# Patient Record
Sex: FEMALE | Race: WHITE | NOT HISPANIC OR LATINO | URBAN - METROPOLITAN AREA
[De-identification: names, ages, dates, MRNs, and addresses within clinical notes are randomized per-mention and may not be internally consistent; named-entity substitution may affect disease eponyms.]

---

## 2020-09-18 ENCOUNTER — OFFICE VISIT (OUTPATIENT)
Dept: URBAN - METROPOLITAN AREA CLINIC 109 | Facility: CLINIC | Age: 60
End: 2020-09-18

## 2020-10-05 ENCOUNTER — OFFICE VISIT (OUTPATIENT)
Dept: URBAN - METROPOLITAN AREA TELEHEALTH 2 | Facility: TELEHEALTH | Age: 60
End: 2020-10-05

## 2020-10-26 ENCOUNTER — OFFICE VISIT (OUTPATIENT)
Dept: URBAN - METROPOLITAN AREA TELEHEALTH 2 | Facility: TELEHEALTH | Age: 60
End: 2020-10-26
Payer: COMMERCIAL

## 2020-10-26 DIAGNOSIS — E11.9 TYPE 2 DIABETES MELLITUS WITHOUT COMPLICATION, WITHOUT LONG-TERM CURRENT USE OF INSULIN: ICD-10-CM

## 2020-10-26 DIAGNOSIS — I25.10 CORONARY ARTERY DISEASE INVOLVING NATIVE CORONARY ARTERY OF NATIVE HEART WITHOUT ANGINA PECTORIS: ICD-10-CM

## 2020-10-26 DIAGNOSIS — K21.00 GASTROESOPHAGEAL REFLUX DISEASE WITH ESOPHAGITIS WITHOUT HEMORRHAGE: ICD-10-CM

## 2020-10-26 PROBLEM — 443502000: Status: ACTIVE | Noted: 2020-10-26

## 2020-10-26 PROBLEM — 313436004: Status: ACTIVE | Noted: 2020-10-26

## 2020-10-26 PROCEDURE — 99213 OFFICE O/P EST LOW 20 MIN: CPT | Performed by: INTERNAL MEDICINE

## 2020-10-26 RX ORDER — LANSOPRAZOLE 30 MG/1
TAKE 1 CAPSULE (30 MG) BY ORAL ROUTE ONCE DAILY BEFORE A MEAL FOR 90 DAYS CAPSULE, DELAYED RELEASE ORAL 1
Qty: 90 | Refills: 4 | COMMUNITY
Start: 2020-04-16 | End: 2021-07-10

## 2020-10-26 RX ORDER — LANSOPRAZOLE 30 MG/1
TAKE 1 CAPSULE (30 MG) BY ORAL ROUTE ONCE DAILY BEFORE A MEAL FOR 90 DAYS CAPSULE, DELAYED RELEASE ORAL 1
Qty: 90 CAPSULE | Refills: 3
Start: 2020-04-16

## 2020-10-26 RX ORDER — CLOPIDOGREL 75 MG/1
TABLET ORAL
Qty: 0 | Refills: 0 | COMMUNITY
Start: 2017-01-01

## 2020-10-26 RX ORDER — IBUPROFEN 600 MG/1
TABLET ORAL
Qty: 0 | Refills: 0 | COMMUNITY
Start: 2016-11-11

## 2020-10-26 NOTE — HPI-TODAY'S VISIT:
The patient returns for this telephone/telehealth visit for history of gastroesophageal reflux.  Her endoscopy in 2019 revealed a small hiatus hernia and a slightly irregular Z-line but no evidence of Talavera's.  She may have had Talavera's on her previous endoscopy with biopsies.  She is status post gastric bypass status. Currently she reports feeling well except for an increase in her reflux symptoms perhaps related to recent social stressors.  She has been on lansoprazole 30 mg daily.  She denies dysphagia.  She has had some difficulty with some plant-based foods although is trying to hold her weight down.  There is been no abdominal pain but she did have a spell of nausea and vomiting.

## 2021-01-05 ENCOUNTER — CLAIMS CREATED FROM THE CLAIM WINDOW (OUTPATIENT)
Dept: URBAN - METROPOLITAN AREA TELEHEALTH 2 | Facility: TELEHEALTH | Age: 61
End: 2021-01-05
Payer: COMMERCIAL

## 2021-01-05 DIAGNOSIS — E66.01 MORBID OBESITY WITH BMI OF 40.0-44.9, ADULT: ICD-10-CM

## 2021-01-05 DIAGNOSIS — K21.00 GASTROESOPHAGEAL REFLUX DISEASE WITH ESOPHAGITIS WITHOUT HEMORRHAGE: ICD-10-CM

## 2021-01-05 DIAGNOSIS — I73.9 PERIPHERAL VASCULAR DISEASE: ICD-10-CM

## 2021-01-05 DIAGNOSIS — Z12.11 COLON CANCER SCREENING: ICD-10-CM

## 2021-01-05 PROBLEM — 38341003 HYPERTENSION: Status: ACTIVE | Noted: 2021-01-05

## 2021-01-05 PROBLEM — 195967001 ASTHMA: Status: ACTIVE | Noted: 2021-01-05

## 2021-01-05 PROBLEM — 128302006 CHRONIC HEPATITIS C: Status: ACTIVE | Noted: 2021-01-05

## 2021-01-05 PROBLEM — 235856003 LIVER DISEASE: Status: ACTIVE | Noted: 2021-01-05

## 2021-01-05 PROBLEM — 268006001 MUSCLE, LIGAMENT AND FASCIA DISORDERS: Status: ACTIVE | Noted: 2021-01-05

## 2021-01-05 PROBLEM — 73211009 DM - DIABETES MELLITUS: Status: ACTIVE | Noted: 2021-01-05

## 2021-01-05 PROBLEM — 3723001 ARTHRITIS: Status: ACTIVE | Noted: 2021-01-05

## 2021-01-05 PROBLEM — 235595009 GASTROESOPHAGEAL REFLUX DISEASE: Status: ACTIVE | Noted: 2021-01-05

## 2021-01-05 PROBLEM — 414916001 OBESITY: Status: ACTIVE | Noted: 2021-01-05

## 2021-01-05 PROBLEM — 271737000 ANEMIA: Status: ACTIVE | Noted: 2021-01-05

## 2021-01-05 PROBLEM — 302914006 BARRETTS ESOPHAGUS: Status: ACTIVE | Noted: 2021-01-05

## 2021-01-05 PROCEDURE — 3017F COLORECTAL CA SCREEN DOC REV: CPT | Performed by: INTERNAL MEDICINE

## 2021-01-05 PROCEDURE — G9903 PT SCRN TBCO ID AS NON USER: HCPCS | Performed by: INTERNAL MEDICINE

## 2021-01-05 PROCEDURE — G8427 DOCREV CUR MEDS BY ELIG CLIN: HCPCS | Performed by: INTERNAL MEDICINE

## 2021-01-05 PROCEDURE — 1036F TOBACCO NON-USER: CPT | Performed by: INTERNAL MEDICINE

## 2021-01-05 PROCEDURE — G8417 CALC BMI ABV UP PARAM F/U: HCPCS | Performed by: INTERNAL MEDICINE

## 2021-01-05 PROCEDURE — 99213 OFFICE O/P EST LOW 20 MIN: CPT | Performed by: INTERNAL MEDICINE

## 2021-01-05 PROCEDURE — G8482 FLU IMMUNIZE ORDER/ADMIN: HCPCS | Performed by: INTERNAL MEDICINE

## 2021-01-05 RX ORDER — LANSOPRAZOLE 30 MG/1
TAKE 1 CAPSULE (30 MG) BY ORAL ROUTE ONCE DAILY BEFORE A MEAL FOR 90 DAYS CAPSULE, DELAYED RELEASE ORAL 1
Qty: 90 CAPSULE | Refills: 3 | Status: ACTIVE | COMMUNITY
Start: 2020-04-16

## 2021-01-05 RX ORDER — CLOPIDOGREL 75 MG/1
TABLET ORAL
Qty: 0 | Refills: 0 | Status: ACTIVE | COMMUNITY
Start: 2017-01-01

## 2021-01-05 RX ORDER — IBUPROFEN 600 MG/1
TABLET ORAL
Qty: 0 | Refills: 0 | Status: ACTIVE | COMMUNITY
Start: 2016-11-11

## 2021-01-05 NOTE — HPI-OTHER HISTORIES
The patient returns for this telehealth visit for a history of GERD and to discuss colon cancer screening.   She had and EGD in 2019 revealing gastric bypass anatomy and mild esophagitis but no Talavera's.  Talavera's had been found on previous EGD with biopsies.  GERD is currently well controlled on lansoprazole 30mg daily.  Colonoscopy was last done in 2009 by our records and was normal.  She is due for routine screening study.  The patient has PVD and stents in the lower extremities for which she takes clopidogrel under the care of Dr. Davie Garcia. She has no history of cardiac disease and has no SOB or chest pain.  Patient has DM, obesity, Htn.

## 2021-02-08 ENCOUNTER — LAB OUTSIDE AN ENCOUNTER (OUTPATIENT)
Dept: URBAN - METROPOLITAN AREA CLINIC 92 | Facility: CLINIC | Age: 61
End: 2021-02-08

## 2021-02-08 ENCOUNTER — TELEPHONE ENCOUNTER (OUTPATIENT)
Dept: URBAN - METROPOLITAN AREA CLINIC 92 | Facility: CLINIC | Age: 61
End: 2021-02-08

## 2021-02-10 ENCOUNTER — TELEPHONE ENCOUNTER (OUTPATIENT)
Dept: URBAN - METROPOLITAN AREA CLINIC 86 | Facility: CLINIC | Age: 61
End: 2021-02-10

## 2021-02-10 RX ORDER — LANSOPRAZOLE 30 MG/1
TAKE 1 CAPSULE (30 MG) BY ORAL ROUTE ONCE DAILY BEFORE A MEAL FOR 90 DAYS CAPSULE, DELAYED RELEASE ORAL ONCE A DAY
Qty: 90 CAPSULE | Refills: 3
Start: 2020-04-16

## 2021-02-10 RX ORDER — PROMETHAZINE HYDROCHLORIDE 25 MG/1
1 TABLET AS NEEDED TABLET ORAL
Qty: 90 TABLET | Refills: 3 | OUTPATIENT
Start: 2021-02-10 | End: 2022-02-05

## 2021-06-18 ENCOUNTER — TELEPHONE ENCOUNTER (OUTPATIENT)
Dept: URBAN - METROPOLITAN AREA CLINIC 92 | Facility: CLINIC | Age: 61
End: 2021-06-18

## 2021-12-28 ENCOUNTER — ERX REFILL RESPONSE (OUTPATIENT)
Dept: URBAN - METROPOLITAN AREA CLINIC 109 | Facility: CLINIC | Age: 61
End: 2021-12-28

## 2021-12-28 RX ORDER — PROMETHAZINE HYDROCHLORIDE 25 MG/1
1 TABLET AS NEEDED TABLET ORAL
Qty: 90 TABLET | Refills: 3 | OUTPATIENT

## 2021-12-28 RX ORDER — PROMETHAZINE HYDROCHLORIDE 25 MG/1
TAKE 1 TABLET EVERY 6 HOURS AS NEEDED TABLET ORAL
Qty: 90 TABLET | Refills: 4 | OUTPATIENT

## 2022-04-05 ENCOUNTER — OFFICE VISIT (OUTPATIENT)
Dept: URBAN - METROPOLITAN AREA CLINIC 109 | Facility: CLINIC | Age: 62
End: 2022-04-05
Payer: COMMERCIAL

## 2022-04-05 VITALS — HEIGHT: 63 IN | TEMPERATURE: 97.2 F | WEIGHT: 239 LBS | BODY MASS INDEX: 42.35 KG/M2

## 2022-04-05 DIAGNOSIS — Z86.19 HISTORY OF HEPATITIS C: ICD-10-CM

## 2022-04-05 DIAGNOSIS — I73.9 PVD (PERIPHERAL VASCULAR DISEASE): ICD-10-CM

## 2022-04-05 DIAGNOSIS — K21.9 GASTROESOPHAGEAL REFLUX DISEASE, UNSPECIFIED WHETHER ESOPHAGITIS PRESENT: ICD-10-CM

## 2022-04-05 PROBLEM — 235595009: Status: ACTIVE | Noted: 2022-04-05

## 2022-04-05 PROCEDURE — 99213 OFFICE O/P EST LOW 20 MIN: CPT | Performed by: INTERNAL MEDICINE

## 2022-04-05 RX ORDER — PROMETHAZINE HYDROCHLORIDE 25 MG/1
1 TABLET AS NEEDED TABLET ORAL
Qty: 90 TABLET | Refills: 3

## 2022-04-05 RX ORDER — PROMETHAZINE HYDROCHLORIDE 25 MG/1
TAKE 1 TABLET EVERY 6 HOURS AS NEEDED TABLET ORAL
Qty: 90 TABLET | Refills: 4 | Status: ACTIVE | COMMUNITY

## 2022-04-05 RX ORDER — LANSOPRAZOLE 30 MG/1
TAKE 1 CAPSULE (30 MG) BY ORAL ROUTE ONCE DAILY BEFORE A MEAL FOR 90 DAYS CAPSULE, DELAYED RELEASE ORAL ONCE A DAY
Qty: 90 CAPSULE | Refills: 3 | Status: ACTIVE | COMMUNITY
Start: 2020-04-16

## 2022-04-05 RX ORDER — LANSOPRAZOLE 30 MG/1
TAKE 1 CAPSULE (30 MG) BY ORAL ROUTE ONCE DAILY BEFORE A MEAL FOR 90 DAYS CAPSULE, DELAYED RELEASE ORAL ONCE A DAY
Qty: 90 CAPSULES | Refills: 3
Start: 2020-04-16

## 2022-04-05 RX ORDER — IBUPROFEN 600 MG/1
TABLET ORAL
Qty: 0 | Refills: 0 | Status: ACTIVE | COMMUNITY
Start: 2016-11-11

## 2022-04-05 RX ORDER — CLOPIDOGREL 75 MG/1
TABLET ORAL
Qty: 0 | Refills: 0 | Status: ACTIVE | COMMUNITY
Start: 2017-01-01

## 2022-04-05 NOTE — HPI-TODAY'S VISIT:
The patient returns for care of GERD. She has been on lanzoprazole 30mg daily with reasonable control of sx. No dysphagia.  Some nausea at times. She had EGD in the past suggesting Barretts but this was not documented at the last set of biopsies on EGD in 2019.  PMH notable for PVD under the care of Dr. Davie Garcia.  On clopidogrel.

## 2022-12-09 ENCOUNTER — WEB ENCOUNTER (OUTPATIENT)
Dept: URBAN - METROPOLITAN AREA CLINIC 109 | Facility: CLINIC | Age: 62
End: 2022-12-09

## 2022-12-09 RX ORDER — PROMETHAZINE HYDROCHLORIDE 25 MG/1
1 TABLET AS NEEDED TABLET ORAL
Qty: 90 TABLETS | Refills: 3

## 2022-12-09 RX ORDER — LANSOPRAZOLE 30 MG/1
1 CAPSULE BEFORE A MEAL CAPSULE, DELAYED RELEASE ORAL ONCE A DAY
Qty: 90 CAPSULE | Refills: 3
Start: 2020-04-16

## 2022-12-19 ENCOUNTER — OFFICE VISIT (OUTPATIENT)
Dept: URBAN - METROPOLITAN AREA CLINIC 109 | Facility: CLINIC | Age: 62
End: 2022-12-19
Payer: COMMERCIAL

## 2022-12-19 VITALS
WEIGHT: 240 LBS | HEIGHT: 63 IN | TEMPERATURE: 97.3 F | BODY MASS INDEX: 42.52 KG/M2 | DIASTOLIC BLOOD PRESSURE: 65 MMHG | HEART RATE: 76 BPM | SYSTOLIC BLOOD PRESSURE: 106 MMHG

## 2022-12-19 DIAGNOSIS — I73.9 PERIPHERAL VASCULAR DISEASE: ICD-10-CM

## 2022-12-19 DIAGNOSIS — K21.00 GASTROESOPHAGEAL REFLUX DISEASE WITH ESOPHAGITIS WITHOUT HEMORRHAGE: ICD-10-CM

## 2022-12-19 DIAGNOSIS — I89.0 LYMPHEDEMA: ICD-10-CM

## 2022-12-19 DIAGNOSIS — E66.01 MORBID OBESITY: ICD-10-CM

## 2022-12-19 PROBLEM — 234097001: Status: ACTIVE | Noted: 2022-12-19

## 2022-12-19 PROBLEM — 238136002: Status: ACTIVE | Noted: 2022-12-19

## 2022-12-19 PROBLEM — 400047006 PERIPHERAL VASCULAR DISEASE: Status: ACTIVE | Noted: 2021-01-05

## 2022-12-19 PROCEDURE — 99213 OFFICE O/P EST LOW 20 MIN: CPT | Performed by: INTERNAL MEDICINE

## 2022-12-19 RX ORDER — LANSOPRAZOLE 30 MG/1
1 CAPSULE TABLET, ORALLY DISINTEGRATING, DELAYED RELEASE ORAL ONCE A DAY
Qty: 90 CAPSULE | Refills: 3
Start: 2020-04-16

## 2022-12-19 RX ORDER — LANSOPRAZOLE 30 MG/1
TAKE 1 CAPSULE (30 MG) BY ORAL ROUTE ONCE DAILY BEFORE A MEAL FOR 90 DAYS CAPSULE, DELAYED RELEASE ORAL ONCE A DAY
Qty: 90 CAPSULES | Refills: 3 | Status: ACTIVE | COMMUNITY
Start: 2020-04-16

## 2022-12-19 RX ORDER — IBUPROFEN 600 MG/1
TABLET ORAL
Qty: 0 | Refills: 0 | Status: ACTIVE | COMMUNITY
Start: 2016-11-11

## 2022-12-19 RX ORDER — PROMETHAZINE HYDROCHLORIDE 25 MG/1
1 TABLET AS NEEDED TABLET ORAL
Qty: 90 TABLET | Refills: 3 | Status: ACTIVE | COMMUNITY

## 2022-12-19 RX ORDER — CLOPIDOGREL 75 MG/1
TABLET ORAL
Qty: 0 | Refills: 0 | Status: ACTIVE | COMMUNITY
Start: 2017-01-01

## 2022-12-19 NOTE — HPI-TODAY'S VISIT:
The patient returns for care of GERD. She is currently taking Lansoprazole 30mg daily. Heartburn is mostly under control on this. Has excess gas from beans. She has anemia  at times. Recently normal.  Her last colonoscopy was about 2020 and was unremarkable by her report.   PMH gastric bypass in 2018, Hx hep C treated in 2018 successfully.           DM, PVD on clopidogrel, chronic lymphedema.

## 2022-12-21 ENCOUNTER — TELEPHONE ENCOUNTER (OUTPATIENT)
Dept: URBAN - METROPOLITAN AREA CLINIC 109 | Facility: CLINIC | Age: 62
End: 2022-12-21

## 2022-12-21 RX ORDER — LANSOPRAZOLE 30 MG/1
1 CAPSULE TABLET, ORALLY DISINTEGRATING, DELAYED RELEASE ORAL ONCE A DAY
Qty: 90 CAPSULE | Refills: 3
Start: 2020-04-16

## 2023-01-11 ENCOUNTER — TELEPHONE ENCOUNTER (OUTPATIENT)
Dept: URBAN - METROPOLITAN AREA CLINIC 109 | Facility: CLINIC | Age: 63
End: 2023-01-11

## 2023-01-11 RX ORDER — LANSOPRAZOLE 30 MG/1
1 CAPSULE TABLET, ORALLY DISINTEGRATING, DELAYED RELEASE ORAL ONCE A DAY
Qty: 90 CAPSULE | Refills: 3 | Status: ACTIVE | COMMUNITY
Start: 2020-04-16

## 2023-01-11 RX ORDER — CLOPIDOGREL 75 MG/1
TABLET ORAL
Qty: 0 | Refills: 0 | Status: ACTIVE | COMMUNITY
Start: 2017-01-01

## 2023-01-11 RX ORDER — PANTOPRAZOLE SODIUM 40 MG/1
1 TABLET TABLET, DELAYED RELEASE ORAL ONCE A DAY
Qty: 90 TABLET | Refills: 3 | OUTPATIENT
Start: 2023-01-11

## 2023-01-11 RX ORDER — PROMETHAZINE HYDROCHLORIDE 25 MG/1
1 TABLET AS NEEDED TABLET ORAL
Qty: 90 TABLET | Refills: 3 | Status: ACTIVE | COMMUNITY

## 2023-01-11 RX ORDER — IBUPROFEN 600 MG/1
TABLET ORAL
Qty: 0 | Refills: 0 | Status: ACTIVE | COMMUNITY
Start: 2016-11-11

## 2023-01-11 RX ORDER — LANSOPRAZOLE 30 MG/1
1 CAPSULE TABLET, ORALLY DISINTEGRATING, DELAYED RELEASE ORAL ONCE A DAY
OUTPATIENT
Start: 2020-04-16

## 2023-06-13 ENCOUNTER — WEB ENCOUNTER (OUTPATIENT)
Dept: URBAN - METROPOLITAN AREA CLINIC 109 | Facility: CLINIC | Age: 63
End: 2023-06-13

## 2023-06-13 RX ORDER — PANTOPRAZOLE SODIUM 40 MG/1
1 TABLET TABLET, DELAYED RELEASE ORAL ONCE A DAY
Qty: 90 TABLET | Refills: 3
Start: 2023-01-11

## 2023-06-13 RX ORDER — PROMETHAZINE HYDROCHLORIDE 25 MG/1
1 TABLET AS NEEDED TABLET ORAL
Qty: 90 TABLET | Refills: 3

## 2023-06-19 ENCOUNTER — OFFICE VISIT (OUTPATIENT)
Dept: URBAN - METROPOLITAN AREA CLINIC 109 | Facility: CLINIC | Age: 63
End: 2023-06-19
Payer: COMMERCIAL

## 2023-06-19 VITALS
BODY MASS INDEX: 41.43 KG/M2 | TEMPERATURE: 97 F | HEIGHT: 63 IN | SYSTOLIC BLOOD PRESSURE: 111 MMHG | HEART RATE: 62 BPM | DIASTOLIC BLOOD PRESSURE: 73 MMHG | WEIGHT: 233.8 LBS

## 2023-06-19 DIAGNOSIS — I73.9 PERIPHERAL VASCULAR DISEASE: ICD-10-CM

## 2023-06-19 DIAGNOSIS — K21.00 GASTROESOPHAGEAL REFLUX DISEASE WITH ESOPHAGITIS WITHOUT HEMORRHAGE: ICD-10-CM

## 2023-06-19 PROCEDURE — 99213 OFFICE O/P EST LOW 20 MIN: CPT | Performed by: INTERNAL MEDICINE

## 2023-06-19 RX ORDER — PANTOPRAZOLE SODIUM 40 MG/1
1 TABLET TABLET, DELAYED RELEASE ORAL ONCE A DAY
Qty: 90 TABLET | Refills: 3 | Status: ACTIVE | COMMUNITY
Start: 2023-01-11

## 2023-06-19 RX ORDER — PROMETHAZINE HYDROCHLORIDE 25 MG/1
1 TABLET AS NEEDED TABLET ORAL
Qty: 90 TABLET | Refills: 3 | Status: ACTIVE | COMMUNITY

## 2023-06-19 RX ORDER — PANTOPRAZOLE SODIUM 40 MG/1
1 TABLET TABLET, DELAYED RELEASE ORAL ONCE A DAY
Qty: 90 TABLET | Refills: 3 | OUTPATIENT
Start: 2023-06-19

## 2023-06-19 RX ORDER — IBUPROFEN 600 MG/1
TABLET ORAL
Qty: 0 | Refills: 0 | Status: ACTIVE | COMMUNITY
Start: 2016-11-11

## 2023-06-19 RX ORDER — CLOPIDOGREL 75 MG/1
TABLET ORAL
Qty: 0 | Refills: 0 | Status: ACTIVE | COMMUNITY
Start: 2017-01-01

## 2023-06-19 NOTE — HPI-TODAY'S VISIT:
Today she reports that heartburn is under good control overall on pantoprazole 40mg daily. She is trying to watch her diet. No dysphagia. Has some excess flatus.  From 12/19/22 visit: The patient returns for care of GERD. She is currently taking Lansoprazole 30mg daily. Heartburn is mostly under control on this. Has excess gas from beans. She has anemia  at times. Recently normal.  Her last colonoscopy was about 2020 and was unremarkable by her report. .  PMH gastric bypass in 2018, Hx hep C treated in 2018 successfully.           DM, PVD on clopidogrel, chronic lymphedema.

## 2023-10-30 NOTE — PHYSICAL EXAM CONSTITUTIONAL:
well developed, well nourished , in no acute distress , ambulating without difficulty , normal communication ability Previously Declined (within the last year)

## 2023-11-14 ENCOUNTER — WEB ENCOUNTER (OUTPATIENT)
Dept: URBAN - METROPOLITAN AREA CLINIC 109 | Facility: CLINIC | Age: 63
End: 2023-11-14

## 2023-11-14 RX ORDER — PANTOPRAZOLE SODIUM 40 MG/1
1 TABLET TABLET, DELAYED RELEASE ORAL ONCE A DAY
Qty: 90 TABLET | Refills: 3
Start: 2023-06-19

## 2023-11-14 RX ORDER — PROMETHAZINE HYDROCHLORIDE 25 MG/1
1 TABLET AS NEEDED TABLET ORAL
Qty: 90 TABLETS | Refills: 2

## 2023-11-20 ENCOUNTER — OFFICE VISIT (OUTPATIENT)
Dept: URBAN - METROPOLITAN AREA CLINIC 109 | Facility: CLINIC | Age: 63
End: 2023-11-20

## 2023-11-20 ENCOUNTER — OFFICE VISIT (OUTPATIENT)
Dept: URBAN - METROPOLITAN AREA CLINIC 109 | Facility: CLINIC | Age: 63
End: 2023-11-20
Payer: COMMERCIAL

## 2023-11-20 VITALS
BODY MASS INDEX: 36.14 KG/M2 | HEART RATE: 74 BPM | TEMPERATURE: 97.9 F | SYSTOLIC BLOOD PRESSURE: 112 MMHG | WEIGHT: 204 LBS | HEIGHT: 63 IN | DIASTOLIC BLOOD PRESSURE: 77 MMHG

## 2023-11-20 DIAGNOSIS — N64.9 BREAST LESION: ICD-10-CM

## 2023-11-20 DIAGNOSIS — K21.00 GASTROESOPHAGEAL REFLUX DISEASE WITH ESOPHAGITIS WITHOUT HEMORRHAGE: ICD-10-CM

## 2023-11-20 PROBLEM — 290073004: Status: ACTIVE | Noted: 2023-11-20

## 2023-11-20 PROCEDURE — 99213 OFFICE O/P EST LOW 20 MIN: CPT | Performed by: INTERNAL MEDICINE

## 2023-11-20 RX ORDER — CLOPIDOGREL 75 MG/1
TABLET ORAL
Qty: 0 | Refills: 0 | COMMUNITY
Start: 2017-01-01

## 2023-11-20 RX ORDER — PANTOPRAZOLE SODIUM 40 MG/1
1 TABLET TABLET, DELAYED RELEASE ORAL ONCE A DAY
Qty: 90 TABLET | Refills: 3 | Status: ON HOLD | COMMUNITY
Start: 2023-06-19

## 2023-11-20 RX ORDER — PANTOPRAZOLE SODIUM 40 MG/1
1 TABLET TABLET, DELAYED RELEASE ORAL ONCE A DAY
Qty: 90 TABLET | Refills: 3 | COMMUNITY
Start: 2023-06-19

## 2023-11-20 RX ORDER — PANTOPRAZOLE SODIUM 40 MG/1
1 TABLET TABLET, DELAYED RELEASE ORAL ONCE A DAY
Qty: 90 TABLET | Refills: 3
Start: 2023-01-11

## 2023-11-20 RX ORDER — PROMETHAZINE HYDROCHLORIDE 25 MG/1
1 TABLET AS NEEDED TABLET ORAL
Qty: 90 TABLET | Refills: 3 | COMMUNITY

## 2023-11-20 RX ORDER — PANTOPRAZOLE SODIUM 40 MG/1
1 TABLET TABLET, DELAYED RELEASE ORAL ONCE A DAY
Qty: 90 TABLET | Refills: 3 | Status: ACTIVE | COMMUNITY
Start: 2023-01-11

## 2023-11-20 RX ORDER — IBUPROFEN 600 MG/1
TABLET ORAL
Qty: 0 | Refills: 0 | COMMUNITY
Start: 2016-11-11

## 2023-11-20 RX ORDER — IBUPROFEN 600 MG/1
TABLET ORAL
Qty: 0 | Refills: 0 | Status: ACTIVE | COMMUNITY
Start: 2016-11-11

## 2023-11-20 RX ORDER — PROMETHAZINE HYDROCHLORIDE 25 MG/1
1 TABLET AS NEEDED TABLET ORAL
Qty: 90 TABLET | Refills: 3 | Status: ACTIVE | COMMUNITY

## 2023-11-20 RX ORDER — CLOPIDOGREL 75 MG/1
TABLET ORAL
Qty: 0 | Refills: 0 | Status: ACTIVE | COMMUNITY
Start: 2017-01-01

## 2023-11-20 RX ORDER — PANTOPRAZOLE SODIUM 40 MG/1
1 TABLET TABLET, DELAYED RELEASE ORAL ONCE A DAY
Qty: 90 TABLET | Refills: 3 | COMMUNITY
Start: 2023-01-11

## 2023-11-20 NOTE — HPI-TODAY'S VISIT:
The patient has GERD and returns for care. Currently feeling well on pantoprazole 40mg daily. No dysophagia. Heartburn controlled. She has lost weight intentionally. Also reports a right breast lesion on mammogram for which she will have surgery soon.  From 6/19/23 visit: Today she reports that heartburn is under good control overall on pantoprazole 40mg daily. She is trying to watch her diet. No dysphagia. Has some excess flatus.  From 12/19/22 visit: The patient returns for care of GERD. She is currently taking Lansoprazole 30mg daily. Heartburn is mostly under control on this. Has excess gas from beans. She has anemia  at times. Recently normal.  Her last colonoscopy was about 2020 and was unremarkable by her report. .  PMH gastric bypass in 2018, Hx hep C treated in 2018 successfully.           DM, PVD on clopidogrel, chronic lymphedema.

## 2024-03-20 ENCOUNTER — OV EP (OUTPATIENT)
Dept: URBAN - METROPOLITAN AREA CLINIC 109 | Facility: CLINIC | Age: 64
End: 2024-03-20

## 2024-03-20 RX ORDER — PANTOPRAZOLE SODIUM 40 MG/1
1 TABLET TABLET, DELAYED RELEASE ORAL ONCE A DAY
Qty: 90 TABLET | Refills: 3 | COMMUNITY
Start: 2023-06-19

## 2024-03-20 RX ORDER — PROMETHAZINE HYDROCHLORIDE 25 MG/1
1 TABLET AS NEEDED TABLET ORAL
Qty: 90 TABLET | Refills: 3 | COMMUNITY

## 2024-03-20 RX ORDER — PANTOPRAZOLE SODIUM 40 MG/1
1 TABLET TABLET, DELAYED RELEASE ORAL ONCE A DAY
Qty: 90 TABLET | Refills: 3 | COMMUNITY
Start: 2023-01-11

## 2024-03-20 RX ORDER — IBUPROFEN 600 MG/1
TABLET ORAL
Qty: 0 | Refills: 0 | COMMUNITY
Start: 2016-11-11

## 2024-03-20 RX ORDER — CLOPIDOGREL 75 MG/1
TABLET ORAL
Qty: 0 | Refills: 0 | COMMUNITY
Start: 2017-01-01